# Patient Record
Sex: MALE | Race: WHITE | NOT HISPANIC OR LATINO | URBAN - NONMETROPOLITAN AREA
[De-identification: names, ages, dates, MRNs, and addresses within clinical notes are randomized per-mention and may not be internally consistent; named-entity substitution may affect disease eponyms.]

---

## 2019-11-04 ENCOUNTER — IMPORTED ENCOUNTER (OUTPATIENT)
Dept: URBAN - NONMETROPOLITAN AREA CLINIC 1 | Facility: CLINIC | Age: 79
End: 2019-11-04

## 2019-11-04 PROBLEM — Z96.1: Noted: 2019-11-04

## 2019-11-04 PROCEDURE — 92014 COMPRE OPH EXAM EST PT 1/>: CPT

## 2020-07-14 NOTE — PATIENT DISCUSSION
The patient was informed that with [address] [facility] for distance, they will need glasses for all near and intermediate activities after surgery. The patient understands there is a possibility they may need an enhancement after surgery. The patient elects [employer] OS, goal of emmetropia.

## 2020-07-21 NOTE — PATIENT DISCUSSION
The patient was informed that with [address] [facility] for distance, they will need glasses for all near and intermediate activities after surgery. The patient understands there is a possibility they may need an enhancement after surgery. The patient elects [employer] OS, goal of emmetropia.

## 2020-07-21 NOTE — PATIENT DISCUSSION
The patient was informed that with [address] [facility] for distance, they will need glasses for all near and intermediate activities after surgery. The patient understands there is a possibility they may need an enhancement after surgery. The patient elects [employer] OS, goal of emmetropia.

## 2020-07-22 NOTE — PATIENT DISCUSSION
Cataract surgery has been performed in the first eye and activities of daily living are still impaired. The patient would like to proceed with cataract surgery in the second eye as scheduled. The patient elects IOL OD TMF +3.25.

## 2020-12-28 NOTE — PATIENT DISCUSSION
Discussed AREDS 2 supplements, UV protection and green leafy vegetables. You were seen in the emergency department for back pain.    Your back seems to be strain and over use related.   Please take previously prescribed medications for this. You can also take tylenol, 1000 mg every 6 hours to help with pain.    Please follow up with occupational medicine to discuss this further.  While I don't believe you have an emergent illness right now, your illness may progress and sometimes in unanticipated ways. If you feel you are worsening, developing new symptoms, or if you have any other concerns, please follow up with your PCP sooner, or return to the ED for evaluation.      While I don't believe you have an emergent illness right now, your illness may progress and sometimes in unanticipated ways. If you feel you are worsening, developing new symptoms, or if you have any other concerns, please follow up with your PCP sooner, or return to the ED for evaluation.

## 2022-04-09 ASSESSMENT — VISUAL ACUITY
OD_CC: 20/100-1
OS_CC: 20/60+2

## 2022-04-09 ASSESSMENT — TONOMETRY
OS_IOP_MMHG: 17
OD_IOP_MMHG: 17